# Patient Record
(demographics unavailable — no encounter records)

---

## 2024-11-20 NOTE — CONSULT LETTER
[Courtesy Letter:] : I had the pleasure of seeing your patient, [unfilled], in my office today. [Please see my note below.] : Please see my note below. [Sincerely,] : Sincerely, [FreeTextEntry2] : Jaim Prescott MD [FreeTextEntry3] : Kevin Powers MD, FACS     [facility] Associate Chair    Department of Otolaryngology  Professor Otolaryngology & Molecular Medicine [facility]

## 2024-11-20 NOTE — HISTORY OF PRESENT ILLNESS
[de-identified] : Pt is here for post right thyroid lobectomy and isthmusectomy for thyroid nodule on 7/14/2024.  Pt is without any complaints and denies any pain or discomfort, or voice change. Pt also has no neck mass, no difficulty swallowing and no painful swallowing.  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 Pt will be making an appointment with his endocrinologist, Jami Cárdenas in 3-4 weeks.

## 2024-11-20 NOTE — CONSULT LETTER
[Courtesy Letter:] : I had the pleasure of seeing your patient, [unfilled], in my office today. [Please see my note below.] : Please see my note below. [Sincerely,] : Sincerely, [FreeTextEntry2] : Jami Prescott MD [FreeTextEntry3] : Kevin Powers MD, FACS     [facility] Associate Chair    Department of Otolaryngology  Professor Otolaryngology & Molecular Medicine [facility]

## 2024-11-20 NOTE — HISTORY OF PRESENT ILLNESS
[de-identified] : Pt is here for post right thyroid lobectomy and isthmusectomy for thyroid nodule on 7/14/2024.  Pt is without any complaints and denies any pain or discomfort, or voice change. Pt also has no neck mass, no difficulty swallowing and no painful swallowing.  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 Pt will be making an appointment with his endocrinologist, Jami Cárdenas in 3-4 weeks.

## 2025-01-08 NOTE — HISTORY OF PRESENT ILLNESS
[de-identified] : Mr. Pate is a 64 year old male who presents for post op follow up s/p right thyroid lobectomy and isthmusectomy for thyroid nodule on 11/14/2024. He reports had some slight swelling in neck. Has decreased.  Denies dysphagia, dyspnea. Maintaining weight.  Pending follow up with Dr. Prescott in February with blood work pending.

## 2025-01-08 NOTE — CONSULT LETTER
[Courtesy Letter:] : I had the pleasure of seeing your patient, [unfilled], in my office today. [Please see my note below.] : Please see my note below. [Sincerely,] : Sincerely, [FreeTextEntry2] : Jami Prescott MD [FreeTextEntry3] : Kevin Powers MD, FACS  [facility] Associate Chair Department of Otolaryngology Professor Otolaryngology & Molecular Medicine [facility]

## 2025-01-08 NOTE — REASON FOR VISIT
[Post-Operative Visit] : a post-operative visit [FreeTextEntry2] : right thyroid lobectomy and isthmusectomy for thyroid nodule on 7/14/2024

## 2025-03-06 NOTE — HISTORY OF PRESENT ILLNESS
[FreeTextEntry1] : Gui is a 64 yr old male, who is here for follow up of thyroid nodule s/p right thyroid lobectomy and isthmusectomy. Per patient he has had COVID a few times, and chronic cough that didn't go  away, so he had CT chest recently in 6/24 for abnormal pulm function tests,  showed right thyroid nodule with calcification. Then had neck US in 6/24 showed 2.8 cm nodule with some calcifications. Then had FNA of the dominant right sided nodule on 7/23/24,came back atypia,thyroseq came back positive. Then was referred to ENT. Had right thyroid lobectomy and isthmusectomy for thyroid nodule on 11/14/24. 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 No h/o neck radiation.  No sig. dysphagia, no SOB. Denies heat or cold intolerance, no weight changes, no constipation or diarrhea,  no palpitations, energy level fair, no skin or hair changes.

## 2025-03-06 NOTE — PHYSICAL EXAM
[Alert] : alert [Well Nourished] : well nourished [No Acute Distress] : no acute distress [Normal Sclera/Conjunctiva] : normal sclera/conjunctiva [No Respiratory Distress] : no respiratory distress [No Accessory Muscle Use] : no accessory muscle use [Clear to Auscultation] : lungs were clear to auscultation bilaterally [Normal S1, S2] : normal S1 and S2 [Normal Rate] : heart rate was normal [Regular Rhythm] : with a regular rhythm [Normal Bowel Sounds] : normal bowel sounds [Not Tender] : non-tender [Soft] : abdomen soft [Normal Gait] : normal gait [No Rash] : no rash [No Tremors] : no tremors [Oriented x3] : oriented to person, place, and time [Normal Affect] : the affect was normal [Normal Insight/Judgement] : insight and judgment were intact [Normal Mood] : the mood was normal [Well Healed Scar] : well healed scar

## 2025-03-06 NOTE — ASSESSMENT
[FreeTextEntry1] : Gui is a 64 yr old male, who is here for follow up of thyroid nodule s/p right thyroid lobectomy and isthmusectomy. 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  Low risk PTCA: TFTs normal at this point will watch for now. Will get Thyroid US.   Follow up in 6 months

## 2025-03-06 NOTE — REVIEW OF SYSTEMS
[Fatigue] : no fatigue [Decreased Appetite] : appetite not decreased [Recent Weight Gain (___ Lbs)] : no recent weight gain [Recent Weight Loss (___ Lbs)] : no recent weight loss [Visual Field Defect] : no visual field defect [Dysphagia] : no dysphagia [Chest Pain] : no chest pain [Palpitations] : no palpitations [Lower Ext Edema] : no lower extremity edema [Shortness Of Breath] : no shortness of breath [Cough] : no cough [Nausea] : no nausea [Abdominal Pain] : no abdominal pain [Vomiting] : no vomiting [Polyuria] : no polyuria [Joint Pain] : no joint pain [Myalgia] : no myalgia  [Acanthosis] : no acanthosis  [Headaches] : no headaches [Dizziness] : no dizziness [Tremors] : no tremors [Depression] : no depression [Polydipsia] : no polydipsia [Cold Intolerance] : no cold intolerance [Heat Intolerance] : no heat intolerance